# Patient Record
Sex: MALE | Race: WHITE
[De-identification: names, ages, dates, MRNs, and addresses within clinical notes are randomized per-mention and may not be internally consistent; named-entity substitution may affect disease eponyms.]

---

## 2017-01-19 ENCOUNTER — HOSPITAL ENCOUNTER (OUTPATIENT)
Dept: HOSPITAL 84 - D.RAD | Age: 57
Discharge: HOME | End: 2017-01-19
Payer: COMMERCIAL

## 2017-01-19 VITALS — BODY MASS INDEX: 33.1 KG/M2

## 2017-01-19 DIAGNOSIS — M54.5: Primary | ICD-10-CM

## 2017-01-19 DIAGNOSIS — M48.06: ICD-10-CM

## 2017-01-19 DIAGNOSIS — M47.26: ICD-10-CM

## 2017-08-02 ENCOUNTER — HOSPITAL ENCOUNTER (OUTPATIENT)
Dept: HOSPITAL 84 - D.MRI | Age: 57
Discharge: HOME | End: 2017-08-02
Attending: FAMILY MEDICINE
Payer: COMMERCIAL

## 2017-08-02 VITALS — BODY MASS INDEX: 33.1 KG/M2

## 2017-08-02 DIAGNOSIS — M25.559: Primary | ICD-10-CM

## 2017-08-15 ENCOUNTER — HOSPITAL ENCOUNTER (OUTPATIENT)
Dept: HOSPITAL 84 - D.MRI | Age: 57
Discharge: HOME | End: 2017-08-15
Attending: FAMILY MEDICINE
Payer: COMMERCIAL

## 2017-08-15 VITALS — BODY MASS INDEX: 33.1 KG/M2

## 2017-08-15 DIAGNOSIS — M25.552: Primary | ICD-10-CM

## 2017-08-17 ENCOUNTER — HOSPITAL ENCOUNTER (OUTPATIENT)
Dept: HOSPITAL 84 - D.MRI | Age: 57
Discharge: HOME | End: 2017-08-17
Attending: FAMILY MEDICINE
Payer: COMMERCIAL

## 2017-08-17 VITALS — WEIGHT: 230.48 LBS | BODY MASS INDEX: 36.17 KG/M2 | BODY MASS INDEX: 36.17 KG/M2 | HEIGHT: 67 IN

## 2017-08-17 VITALS — DIASTOLIC BLOOD PRESSURE: 93 MMHG | SYSTOLIC BLOOD PRESSURE: 130 MMHG

## 2017-08-17 DIAGNOSIS — I25.10: ICD-10-CM

## 2017-08-17 DIAGNOSIS — I10: ICD-10-CM

## 2017-08-17 DIAGNOSIS — Z95.5: ICD-10-CM

## 2017-08-17 DIAGNOSIS — J44.9: ICD-10-CM

## 2017-08-17 DIAGNOSIS — G47.30: ICD-10-CM

## 2017-08-17 DIAGNOSIS — Z95.1: ICD-10-CM

## 2017-08-17 DIAGNOSIS — M25.552: Primary | ICD-10-CM

## 2017-08-17 LAB
ANION GAP SERPL CALC-SCNC: 8.2 MMOL/L (ref 8–16)
BASOPHILS NFR BLD AUTO: 0.5 % (ref 0–2)
BUN SERPL-MCNC: 8 MG/DL (ref 7–18)
CALCIUM SERPL-MCNC: 8.3 MG/DL (ref 8.5–10.1)
CHLORIDE SERPL-SCNC: 104 MMOL/L (ref 98–107)
CO2 SERPL-SCNC: 31.8 MMOL/L (ref 21–32)
CREAT SERPL-MCNC: 0.9 MG/DL (ref 0.6–1.3)
EOSINOPHIL NFR BLD: 2.5 % (ref 0–7)
ERYTHROCYTE [DISTWIDTH] IN BLOOD BY AUTOMATED COUNT: 13.1 % (ref 11.5–14.5)
GLUCOSE SERPL-MCNC: 104 MG/DL (ref 74–106)
HCT VFR BLD CALC: 47.7 % (ref 42–54)
HGB BLD-MCNC: 16.6 G/DL (ref 13.5–17.5)
IMM GRANULOCYTES NFR BLD: 0.8 % (ref 0–5)
LYMPHOCYTES NFR BLD AUTO: 26.9 % (ref 15–50)
MCH RBC QN AUTO: 33.1 PG (ref 26–34)
MCHC RBC AUTO-ENTMCNC: 34.8 G/DL (ref 31–37)
MCV RBC: 95 FL (ref 80–100)
MONOCYTES NFR BLD: 8.8 % (ref 2–11)
NEUTROPHILS NFR BLD AUTO: 60.5 % (ref 40–80)
OSMOLALITY SERPL CALC.SUM OF ELEC: 276 MOSM/KG (ref 275–300)
PLATELET # BLD: 204 10X3/UL (ref 130–400)
PMV BLD AUTO: 10.2 FL (ref 7.4–10.4)
POTASSIUM SERPL-SCNC: 4 MMOL/L (ref 3.5–5.1)
RBC # BLD AUTO: 5.02 10X6/UL (ref 4.2–6.1)
SODIUM SERPL-SCNC: 140 MMOL/L (ref 136–145)
WBC # BLD AUTO: 6.5 10X3/UL (ref 4.8–10.8)

## 2017-08-17 NOTE — NUR
IV TO RIGHT HAND DC'D WITH TIP INTACT, NO REDNESS OR SWELLING NOTED
TO SITE. DISCHARGE INSTRUCTIONS REVIEWED, WILL FOLLOW UP WITH DR ROMERO FOR RESULTS. VERBALIZED UNDERSTANDING. DC'D VIA WC WITH FAMILY
AND STAFF

## 2018-04-28 ENCOUNTER — HOSPITAL ENCOUNTER (OUTPATIENT)
Dept: HOSPITAL 84 - D.ER | Age: 58
Setting detail: OBSERVATION
LOS: 1 days | Discharge: HOME | End: 2018-04-29
Attending: INTERNAL MEDICINE | Admitting: INTERNAL MEDICINE
Payer: COMMERCIAL

## 2018-04-28 VITALS
BODY MASS INDEX: 34.07 KG/M2 | HEIGHT: 67 IN | WEIGHT: 217.06 LBS | BODY MASS INDEX: 34.07 KG/M2 | WEIGHT: 217.06 LBS | HEIGHT: 67 IN

## 2018-04-28 VITALS — DIASTOLIC BLOOD PRESSURE: 95 MMHG | SYSTOLIC BLOOD PRESSURE: 135 MMHG

## 2018-04-28 VITALS — SYSTOLIC BLOOD PRESSURE: 127 MMHG | DIASTOLIC BLOOD PRESSURE: 86 MMHG

## 2018-04-28 DIAGNOSIS — G25.81: ICD-10-CM

## 2018-04-28 DIAGNOSIS — I16.0: Primary | ICD-10-CM

## 2018-04-28 DIAGNOSIS — Z95.5: ICD-10-CM

## 2018-04-28 DIAGNOSIS — I25.110: ICD-10-CM

## 2018-04-28 DIAGNOSIS — Z95.1: ICD-10-CM

## 2018-04-28 DIAGNOSIS — I48.91: ICD-10-CM

## 2018-04-28 DIAGNOSIS — Z87.891: ICD-10-CM

## 2018-04-28 LAB
ALBUMIN SERPL-MCNC: 3.4 G/DL (ref 3.4–5)
ALP SERPL-CCNC: 72 U/L (ref 46–116)
ALT SERPL-CCNC: 64 U/L (ref 10–68)
ANION GAP SERPL CALC-SCNC: 13.4 MMOL/L (ref 8–16)
BASOPHILS NFR BLD AUTO: 0.6 % (ref 0–2)
BILIRUB SERPL-MCNC: 0.32 MG/DL (ref 0.2–1.3)
BUN SERPL-MCNC: 12 MG/DL (ref 7–18)
CALCIUM SERPL-MCNC: 8 MG/DL (ref 8.5–10.1)
CHLORIDE SERPL-SCNC: 106 MMOL/L (ref 98–107)
CHOLEST/HDLC SERPL: 3 RATIO (ref 2.3–4.9)
CK MB SERPL-MCNC: 0.3 U/L (ref 0–3.6)
CK MB SERPL-MCNC: 0.4 U/L (ref 0–3.6)
CK SERPL-CCNC: 76 UL (ref 21–232)
CK SERPL-CCNC: 87 UL (ref 21–232)
CO2 SERPL-SCNC: 23.5 MMOL/L (ref 21–32)
CREAT SERPL-MCNC: 0.9 MG/DL (ref 0.6–1.3)
EOSINOPHIL NFR BLD: 2 % (ref 0–7)
ERYTHROCYTE [DISTWIDTH] IN BLOOD BY AUTOMATED COUNT: 13.1 % (ref 11.5–14.5)
GLOBULIN SER-MCNC: 3.4 G/L
GLUCOSE SERPL-MCNC: 123 MG/DL (ref 74–106)
HCT VFR BLD CALC: 51 % (ref 42–54)
HDLC SERPL-MCNC: 63 MG/DL (ref 32–96)
HGB BLD-MCNC: 18 G/DL (ref 13.5–17.5)
IMM GRANULOCYTES NFR BLD: 0.3 % (ref 0–5)
LDL-HDL RATIO: 1.7 RATIO (ref 1.5–3.5)
LDLC SERPL-MCNC: 106 MG/DL (ref 0–100)
LYMPHOCYTES NFR BLD AUTO: 26.7 % (ref 15–50)
MCH RBC QN AUTO: 32.9 PG (ref 26–34)
MCHC RBC AUTO-ENTMCNC: 35.3 G/DL (ref 31–37)
MCV RBC: 93.2 FL (ref 80–100)
MONOCYTES NFR BLD: 9.7 % (ref 2–11)
NEUTROPHILS NFR BLD AUTO: 60.7 % (ref 40–80)
OSMOLALITY SERPL CALC.SUM OF ELEC: 278 MOSM/KG (ref 275–300)
PHENYTOIN SERPL-MCNC: 6.5 UG/ML (ref 10–20)
PLATELET # BLD: 180 10X3/UL (ref 130–400)
PMV BLD AUTO: 10 FL (ref 7.4–10.4)
POTASSIUM SERPL-SCNC: 3.9 MMOL/L (ref 3.5–5.1)
PROT SERPL-MCNC: 6.8 G/DL (ref 6.4–8.2)
RBC # BLD AUTO: 5.47 10X6/UL (ref 4.2–6.1)
SODIUM SERPL-SCNC: 139 MMOL/L (ref 136–145)
TRIGL SERPL-MCNC: 100 MG/DL (ref 30–200)
TROPONIN I SERPL-MCNC: < 0.017 NG/ML (ref 0–0.06)
TROPONIN I SERPL-MCNC: < 0.017 NG/ML (ref 0–0.06)
WBC # BLD AUTO: 6.9 10X3/UL (ref 4.8–10.8)

## 2018-04-29 VITALS — DIASTOLIC BLOOD PRESSURE: 66 MMHG | SYSTOLIC BLOOD PRESSURE: 115 MMHG

## 2018-04-29 VITALS — SYSTOLIC BLOOD PRESSURE: 117 MMHG | DIASTOLIC BLOOD PRESSURE: 76 MMHG

## 2018-04-29 LAB
ANION GAP SERPL CALC-SCNC: 16.5 MMOL/L (ref 8–16)
BASOPHILS NFR BLD AUTO: 0.7 % (ref 0–2)
BUN SERPL-MCNC: 11 MG/DL (ref 7–18)
CALCIUM SERPL-MCNC: 8 MG/DL (ref 8.5–10.1)
CHLORIDE SERPL-SCNC: 106 MMOL/L (ref 98–107)
CK MB SERPL-MCNC: 0.2 U/L (ref 0–3.6)
CK MB SERPL-MCNC: 0.3 U/L (ref 0–3.6)
CK SERPL-CCNC: 60 UL (ref 21–232)
CK SERPL-CCNC: 69 UL (ref 21–232)
CO2 SERPL-SCNC: 21.8 MMOL/L (ref 21–32)
CREAT SERPL-MCNC: 0.8 MG/DL (ref 0.6–1.3)
EOSINOPHIL NFR BLD: 3 % (ref 0–7)
ERYTHROCYTE [DISTWIDTH] IN BLOOD BY AUTOMATED COUNT: 13.3 % (ref 11.5–14.5)
GLUCOSE SERPL-MCNC: 106 MG/DL (ref 74–106)
HCT VFR BLD CALC: 52.3 % (ref 42–54)
HGB BLD-MCNC: 18.3 G/DL (ref 13.5–17.5)
IMM GRANULOCYTES NFR BLD: 0.1 % (ref 0–5)
LYMPHOCYTES NFR BLD AUTO: 27.1 % (ref 15–50)
MCH RBC QN AUTO: 32.9 PG (ref 26–34)
MCHC RBC AUTO-ENTMCNC: 35 G/DL (ref 31–37)
MCV RBC: 93.9 FL (ref 80–100)
MONOCYTES NFR BLD: 11.7 % (ref 2–11)
NEUTROPHILS NFR BLD AUTO: 57.4 % (ref 40–80)
OSMOLALITY SERPL CALC.SUM OF ELEC: 277 MOSM/KG (ref 275–300)
PLATELET # BLD: 187 10X3/UL (ref 130–400)
PMV BLD AUTO: 9.8 FL (ref 7.4–10.4)
POTASSIUM SERPL-SCNC: 4.3 MMOL/L (ref 3.5–5.1)
RBC # BLD AUTO: 5.57 10X6/UL (ref 4.2–6.1)
SODIUM SERPL-SCNC: 140 MMOL/L (ref 136–145)
TROPONIN I SERPL-MCNC: < 0.017 NG/ML (ref 0–0.06)
TROPONIN I SERPL-MCNC: < 0.017 NG/ML (ref 0–0.06)
WBC # BLD AUTO: 6.8 10X3/UL (ref 4.8–10.8)